# Patient Record
Sex: FEMALE | Race: WHITE | NOT HISPANIC OR LATINO | Employment: UNEMPLOYED | URBAN - NONMETROPOLITAN AREA
[De-identification: names, ages, dates, MRNs, and addresses within clinical notes are randomized per-mention and may not be internally consistent; named-entity substitution may affect disease eponyms.]

---
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
## 2023-01-04 RX ORDER — MEDROXYPROGESTERONE ACETATE 150 MG/ML
150 INJECTION, SUSPENSION INTRAMUSCULAR
Qty: 1 ML | Refills: 0 | Status: SHIPPED | OUTPATIENT
Start: 2023-01-04

## 2023-01-22 ENCOUNTER — HOSPITAL ENCOUNTER (EMERGENCY)
Facility: HOSPITAL | Age: 18
Discharge: HOME | End: 2023-01-22
Attending: EMERGENCY MEDICINE | Admitting: FAMILY MEDICINE
Payer: COMMERCIAL

## 2023-01-22 VITALS
HEART RATE: 87 BPM | HEIGHT: 63 IN | SYSTOLIC BLOOD PRESSURE: 99 MMHG | DIASTOLIC BLOOD PRESSURE: 68 MMHG | TEMPERATURE: 98.3 F | OXYGEN SATURATION: 97 % | BODY MASS INDEX: 27.64 KG/M2 | RESPIRATION RATE: 18 BRPM | WEIGHT: 156 LBS

## 2023-01-22 DIAGNOSIS — K04.7 DENTAL ABSCESS: Primary | ICD-10-CM

## 2023-01-22 PROCEDURE — 63710000001 ONDANSETRON ODT 4 MG TABLET DISPERSIBLE

## 2023-01-22 PROCEDURE — 99283 EMERGENCY DEPT VISIT LOW MDM: CPT

## 2023-01-22 RX ORDER — HYDROCODONE BITARTRATE AND ACETAMINOPHEN 5; 325 MG/1; MG/1
1 TABLET ORAL ONCE
Status: COMPLETED | OUTPATIENT
Start: 2023-01-22 | End: 2023-01-22

## 2023-01-22 RX ORDER — LIDOCAINE HYDROCHLORIDE 20 MG/ML
10 SOLUTION OROPHARYNGEAL ONCE
Status: COMPLETED | OUTPATIENT
Start: 2023-01-22 | End: 2023-01-22

## 2023-01-22 RX ORDER — ONDANSETRON 4 MG/1
4 TABLET, ORALLY DISINTEGRATING ORAL ONCE
Status: COMPLETED | OUTPATIENT
Start: 2023-01-22 | End: 2023-01-22

## 2023-01-22 RX ORDER — ALUMINA, MAGNESIA, AND SIMETHICONE 2400; 2400; 240 MG/30ML; MG/30ML; MG/30ML
10 SUSPENSION ORAL ONCE
Status: COMPLETED | OUTPATIENT
Start: 2023-01-22 | End: 2023-01-22

## 2023-01-22 RX ORDER — PENICILLIN V POTASSIUM 500 MG/1
500 TABLET ORAL ONCE
Status: COMPLETED | OUTPATIENT
Start: 2023-01-22 | End: 2023-01-22

## 2023-01-22 RX ORDER — PENICILLIN V POTASSIUM 500 MG/1
500 TABLET ORAL 4 TIMES DAILY
Qty: 28 TABLET | Refills: 0 | Status: SHIPPED | OUTPATIENT
Start: 2023-01-22 | End: 2023-01-29

## 2023-01-22 RX ADMIN — ALUMINUM HYDROXIDE, MAGNESIUM HYDROXIDE, SIMETHICONE 10 ML: 200; 200; 20 LIQUID ORAL at 17:39

## 2023-01-22 RX ADMIN — ONDANSETRON 4 MG: 4 TABLET, ORALLY DISINTEGRATING ORAL at 17:32

## 2023-01-22 RX ADMIN — BENZOCAINE, BUTAMBEN, AND TETRACAINE HYDROCHLORIDE: .028; .004; .004 AEROSOL, SPRAY TOPICAL at 17:33

## 2023-01-22 RX ADMIN — HYDROCODONE BITARTRATE AND ACETAMINOPHEN 1 TABLET: 5; 325 TABLET ORAL at 17:32

## 2023-01-22 RX ADMIN — PENICILLIN V POTASSIUM 500 MG: 500 TABLET, FILM COATED ORAL at 17:39

## 2023-01-22 RX ADMIN — LIDOCAINE HYDROCHLORIDE 10 ML: 20 SOLUTION ORAL; TOPICAL at 17:32

## 2023-01-22 NOTE — DISCHARGE INSTRUCTIONS
Home to rest. Take antibiotics as prescribed. May use dental balls to affected tooth for temporary relief of pain. Tylenol and motrin for pain. May use dental wax to protect open area temporarily. Call dentist for appointment. May use number provided of try [facility]. Return for worsening symptoms.

## 2023-01-22 NOTE — Clinical Note
[facility] EMERGENCY DEPARTMENT  [address] [place] [id number]  Phone: [phone]    Bina Pedroza was seen and treated in our emergency department on 1/22/2023.  She may return to work on 01/24/2023.         Thank you for choosing [facility].    Sherly Elizondo, APRN       no weight-bearing restrictions

## 2023-01-22 NOTE — ED PROVIDER NOTES
Subjective   History of Present Illness  17 year old female patient presents to ER for complaint of dental pain and swelling to left lower jaw for past two days. She has nausea, but denies fever. She reports that she had part of a root canal which was never completed and she does not currently have a dental home. She has been taking ibuprofen and an unknown antibiotic she had leftover from a previous concern without relief. She uses an electronic cigarette, denies ETOH or illicit drug use.        Review of Systems   Constitutional: Negative.  Negative for fever.   HENT: Positive for dental problem and facial swelling. Negative for trouble swallowing.    Eyes: Negative.    Respiratory: Negative.    Cardiovascular: Negative.    Gastrointestinal: Positive for nausea. Negative for vomiting.   Endocrine: Negative.    Genitourinary: Negative.    Musculoskeletal: Negative.    Skin: Negative.    Allergic/Immunologic: Negative.    Neurological: Negative.    Hematological: Negative.    Psychiatric/Behavioral: Negative.        Past Medical History:   Diagnosis Date   • Anemia during pregnancy in third trimester 4/27/2020   • Anxiety    • Depressed    • Encounter for administrative examinations     unspecified   • Hand pain     Right hand 4th digit closed fracture   • High-risk pregnancy, young multigravida, unspecified trimester 11/20/2019   • History of precipitous delivery    • Mood disorder (HCC)    • Pain in throat    • Smoker    • Upper respiratory infection    • Verruca vulgaris        No Known Allergies    Past Surgical History:   Procedure Laterality Date   • SPLINT APPLICATION      finger splint dynamic  (1)       Family History   Problem Relation Age of Onset   • No Known Problems Father    • No Known Problems Sister    • No Known Problems Daughter    • Prostate cancer Paternal Grandfather    • Liver cancer Paternal Grandmother    • COPD Maternal Grandmother    • Heart disease Maternal Grandmother    • No Known  "Problems Sister    • No Known Problems Son    • No Known Problems Brother        Social History     Socioeconomic History   • Marital status: Single   Tobacco Use   • Smoking status: Former     Packs/day: 0.50     Years: 2.00     Pack years: 1.00     Types: Cigarettes   • Smokeless tobacco: Never   Vaping Use   • Vaping Use: Every day   Substance and Sexual Activity   • Alcohol use: Not Currently   • Drug use: No   • Sexual activity: Yes     Partners: Male           Objective    BP 99/68 (BP Location: Right arm, Patient Position: Sitting)   Pulse 87   Temp 98.3 °F (36.8 °C) (Oral)   Resp 18   Ht 160 cm (63\")   Wt 70.8 kg (156 lb)   LMP  (LMP Unknown)   SpO2 97%   Breastfeeding No   BMI 27.63 kg/m²     Physical Exam  Vitals and nursing note reviewed.   Constitutional:       General: She is not in acute distress.     Appearance: Normal appearance. She is not ill-appearing, toxic-appearing or diaphoretic.   HENT:      Head: Normocephalic.      Right Ear: External ear normal.      Left Ear: External ear normal.      Nose: Nose normal.      Mouth/Throat:      Mouth: Mucous membranes are moist.      Dentition: Dental tenderness, dental caries and dental abscesses present.      Pharynx: Uvula midline. No pharyngeal swelling, oropharyngeal exudate, posterior oropharyngeal erythema or uvula swelling.        Comments: Dental darlene noted between left mandibular teeth as marked above without bleeding. Crowns of teeth intact, no pulp visible.   Eyes:      Pupils: Pupils are equal, round, and reactive to light.   Cardiovascular:      Rate and Rhythm: Normal rate and regular rhythm.      Pulses: Normal pulses.      Heart sounds: Normal heart sounds.   Pulmonary:      Effort: Pulmonary effort is normal.      Breath sounds: Normal breath sounds.   Abdominal:      General: Bowel sounds are normal.      Palpations: Abdomen is soft.   Musculoskeletal:         General: Normal range of motion.      Cervical back: Normal range of " motion and neck supple. Tenderness present.   Lymphadenopathy:      Cervical: No cervical adenopathy.   Skin:     General: Skin is warm and dry.      Capillary Refill: Capillary refill takes less than 2 seconds.   Neurological:      Mental Status: She is alert and oriented to person, place, and time.   Psychiatric:         Mood and Affect: Mood normal.         Behavior: Behavior normal.         Thought Content: Thought content normal.         Judgment: Judgment normal.         Procedures           ED Course                                           MDM    Final diagnoses:   Dental abscess       ED Disposition  ED Disposition     ED Disposition   Discharge    Condition   Stable    Comment   --             Ross Jacques, CALEB  [address] [place] [id number] [phone]      ER follow up, As needed         Medication List      New Prescriptions    penicillin v potassium 500 MG tablet  Commonly known as: VEETID  Take 1 tablet by mouth 4 (Four) Times a Day for 7 days.           Where to Get Your Medications      These medications were sent to [facility] - [place] - [address] - [phone]  - [phone]  [address], [facility] [address]    Phone: [phone]   · penicillin v potassium 500 MG tablet          Sherly Elizondo, APRN  01/22/23 1301

## 2023-07-14 PROBLEM — Z87.59 HISTORY OF PRIOR PREGNANCY WITH IUGR NEWBORN: Status: ACTIVE | Noted: 2023-07-14

## 2023-07-14 PROBLEM — O46.8X1 SUBCHORIONIC HEMORRHAGE IN FIRST TRIMESTER: Status: ACTIVE | Noted: 2023-07-14

## 2023-07-14 PROBLEM — Z82.79 FAMILY HISTORY OF DOWN SYNDROME: Status: RESOLVED | Noted: 2021-11-15 | Resolved: 2023-07-14

## 2023-07-14 PROBLEM — O41.8X10 SUBCHORIONIC HEMORRHAGE IN FIRST TRIMESTER: Status: RESOLVED | Noted: 2021-11-15 | Resolved: 2023-07-14

## 2023-07-14 PROBLEM — F32.A DEPRESSION AFFECTING PREGNANCY: Status: RESOLVED | Noted: 2020-04-27 | Resolved: 2023-07-14

## 2023-07-14 PROBLEM — O99.013 ANEMIA IN PREGNANCY, THIRD TRIMESTER: Status: RESOLVED | Noted: 2022-05-25 | Resolved: 2023-07-14

## 2023-07-14 PROBLEM — A74.9 CHLAMYDIA INFECTION DURING PREGNANCY: Status: RESOLVED | Noted: 2021-11-15 | Resolved: 2023-07-14

## 2023-07-14 PROBLEM — O09.30 HISTORY OF INADEQUATE PRENATAL CARE: Status: RESOLVED | Noted: 2022-03-17 | Resolved: 2023-07-14

## 2023-07-14 PROBLEM — O41.8X10 SUBCHORIONIC HEMORRHAGE IN FIRST TRIMESTER: Status: ACTIVE | Noted: 2023-07-14

## 2023-07-14 PROBLEM — O99.340 DEPRESSION AFFECTING PREGNANCY: Status: RESOLVED | Noted: 2020-04-27 | Resolved: 2023-07-14

## 2023-07-14 PROBLEM — O99.019 ANEMIA AFFECTING PREGNANCY, ANTEPARTUM: Status: RESOLVED | Noted: 2022-05-05 | Resolved: 2023-07-14

## 2023-07-14 PROBLEM — O98.819 CHLAMYDIA INFECTION DURING PREGNANCY: Status: RESOLVED | Noted: 2021-11-15 | Resolved: 2023-07-14

## 2023-07-14 PROBLEM — Z87.59 HISTORY OF PRIOR PREGNANCY WITH IUGR NEWBORN: Status: RESOLVED | Noted: 2021-11-15 | Resolved: 2023-07-14

## 2023-07-14 PROBLEM — O46.8X1 SUBCHORIONIC HEMORRHAGE IN FIRST TRIMESTER: Status: RESOLVED | Noted: 2021-11-15 | Resolved: 2023-07-14

## 2023-07-14 PROBLEM — Z91.89 AT RISK FOR GESTATIONAL DIABETES MELLITUS: Status: RESOLVED | Noted: 2022-05-02 | Resolved: 2023-07-14

## 2023-07-14 PROBLEM — Z37.9 NORMAL LABOR: Status: RESOLVED | Noted: 2022-06-19 | Resolved: 2023-07-14

## 2023-07-14 PROBLEM — Z91.199 NONCOMPLIANCE: Status: RESOLVED | Noted: 2020-05-18 | Resolved: 2023-07-14

## 2023-08-10 ENCOUNTER — ROUTINE PRENATAL (OUTPATIENT)
Dept: OBSTETRICS AND GYNECOLOGY | Facility: CLINIC | Age: 18
End: 2023-08-10
Payer: COMMERCIAL

## 2023-08-10 ENCOUNTER — LAB (OUTPATIENT)
Dept: LAB | Facility: HOSPITAL | Age: 18
End: 2023-08-10
Payer: COMMERCIAL

## 2023-08-10 VITALS — WEIGHT: 164 LBS | DIASTOLIC BLOOD PRESSURE: 70 MMHG | BODY MASS INDEX: 29.05 KG/M2 | SYSTOLIC BLOOD PRESSURE: 102 MMHG

## 2023-08-10 DIAGNOSIS — O41.8X10 SUBCHORIONIC HEMORRHAGE OF PLACENTA IN FIRST TRIMESTER, SINGLE OR UNSPECIFIED FETUS: ICD-10-CM

## 2023-08-10 DIAGNOSIS — Z36.89 ENCOUNTER FOR FETAL ANATOMIC SURVEY: ICD-10-CM

## 2023-08-10 DIAGNOSIS — O09.91 SUPERVISION OF HIGH RISK PREGNANCY IN FIRST TRIMESTER: Primary | ICD-10-CM

## 2023-08-10 DIAGNOSIS — O21.9 NAUSEA AND VOMITING DURING PREGNANCY: ICD-10-CM

## 2023-08-10 DIAGNOSIS — O46.8X1 SUBCHORIONIC HEMORRHAGE OF PLACENTA IN FIRST TRIMESTER, SINGLE OR UNSPECIFIED FETUS: ICD-10-CM

## 2023-08-10 DIAGNOSIS — Z36.0 ENCOUNTER FOR ANTENATAL SCREENING FOR CHROMOSOMAL ANOMALIES: ICD-10-CM

## 2023-08-10 DIAGNOSIS — Z3A.11 11 WEEKS GESTATION OF PREGNANCY: ICD-10-CM

## 2023-08-10 DIAGNOSIS — Z87.59 HISTORY OF PRECIPITOUS DELIVERY: ICD-10-CM

## 2023-08-10 RX ORDER — ONDANSETRON 4 MG/1
4 TABLET, FILM COATED ORAL EVERY 8 HOURS PRN
Qty: 30 TABLET | Refills: 2 | Status: SHIPPED | OUTPATIENT
Start: 2023-08-10 | End: 2024-08-09

## 2023-08-10 NOTE — PROGRESS NOTES
CC: Prenatal visit    Bina Pedroza is a 18 y.o.  at 11w6d.  Doing well.  Denies contractions, LOF, or VB.  Having some nausea and a couple episodes of vomiting.    /70   Wt 74.4 kg (164 lb)   LMP 2023 (Exact Date)   BMI 29.05 kg/mý      Fetal Heart Rate: 130s     Problems (from 23 to present)       Problem Noted Resolved    History of precipitous delivery 8/10/2023 by Tereza Ludwig MD No    Subchorionic hemorrhage in first trimester 2023 by Lori Danielson APRN No    Overview Signed 2023  1:29 PM by Lori Danielson APRN     JANI 3.09cm.          Supervision of high risk pregnancy in first trimester 11/15/2021 by Bello Wilkes MD No    Overview Signed 11/15/2021  4:30 PM by Bello Wilkes MD     By ultrasound 2021 8-3/7-week BETZY 2022               A/P: Bina Pedroza is a 18 y.o.  at 11w6d.  - RTC in 4 weeks  - RTC in 8 weeks w/ anatomy US  - NIPT ordered  - Zofran     Diagnosis Plan   1. Supervision of high risk pregnancy in first trimester        2. Subchorionic hemorrhage of placenta in first trimester, single or unspecified fetus        3. Encounter for  screening for chromosomal anomalies  Ita Robinma Prenatal Test: Chromosomes 13, 18, 21, X & Y: Triploidy 22Q.11.2 Deletion - Blood,      4. Nausea and vomiting during pregnancy  ondansetron (Zofran) 4 MG tablet      5. Encounter for fetal anatomic survey  US Ob 14 + Weeks Single or First Gestation      6. History of precipitous delivery        7. 11 weeks gestation of pregnancy          Tereza Ludwig MD  8/10/2023  14:59 CDT

## 2023-08-29 ENCOUNTER — PATIENT OUTREACH (OUTPATIENT)
Dept: LABOR AND DELIVERY | Facility: HOSPITAL | Age: 18
End: 2023-08-29
Payer: COMMERCIAL

## 2023-08-29 NOTE — OUTREACH NOTE
[facility]  Enrollment    Current Estimated Gestational Age: 14w4d    Questions/Answers      Flowsheet Row Responses   Would like to participate? No            Contact info provided, encouraged to call if she has any questions, concerns, or needs assistance with resources.  DL Cooley RN  Maternity Nurse Navigator    8/29/2023, 12:46 CDT

## 2025-01-26 NOTE — PROGRESS NOTES
10/13/19 1100   Group 1   Start Time 0930   Stop Time 1015   Length (min) 45 Min   Group Name Check-In   Focus of Group Symptoms and Goals   Attendance Not present     SUSANA Arango     "CC: Prenatal visit    Bina Pedroza is a 15 y.o.  at 31w1d.  Doing well.  No complaints.  Denies contractions, LOF, or VB.  Reports good FM.    BP (!) 102/59   Wt 64.4 kg (142 lb)   LMP  (LMP Unknown)   Fetal NST with baseline of approximately 120 bpm positive accelerations, no decelerations moderate variability, no contractions  BPP done with 8 out of 8 findings.  SAVAGE was 9.5 cm.  Fundal Height (cm): 30 cm  Fetal Heart Rate: 135     Problems (from 19 to present)     Problem Noted Resolved    Noncompliance by refusing service 2020 by Bello Wilkes MD No    IUGR (intrauterine growth restriction) affecting care of mother, third trimester, fetus 1 3/19/2020 by Bello Wilkes MD No    Overview Signed 3/19/2020  4:24 PM by Bello Wilkes MD     Severe.  Discussed Dr. Rubi  group.  X2 a week biophysical profile and NST         Young multigravida in second trimester 2019 by Lori Danielson APRN No    Overview Addendum 2020  9:06 AM by Lori Danielson APRN     O pos / Neg antibody / Rubella Imm / GBS unk  Dating: BETZY 2020 by [facility] (unknown LMP)  Genetics: Declined  Flu vaccine: Declined  Anatomy: It's a BOY \"Anibal Mac\"- subopts remain.   Tdap: 28 wk  H&H/Plts:  36.7 / 378 on 19  Plans to formula feed         Tobacco use in pregnancy, antepartum 2019 by Lori Danielson APRN No    Overview Signed 2019  3:55 PM by Lori Danielson APRN     1/2 pack day smoker.  Counseled on cessation/cutting back.          Influenza vaccine refused 2019 by Lori Danielson APRN No    Anxiety and depression 2017 by Coleen Franco MD No    High-risk pregnancy, young multigravida, unspecified trimester 2019 by Lori Danielson APRN 2019 by Lori Danielson APRN    Nausea and vomiting during pregnancy prior to 22 weeks gestation 2019 by Lori Danielson APRN 2020 by Lori Danielson APRN    Overview " Signed 2019  3:56 PM by Lori Danielson APRN     Vitamin B6                A/P: Bina Pedroza is a 15 y.o.  at 31w1d.  Patient with above problems currently stable doing well with reactive NST and 8 out of 8 BPP today.  Patient to follow-up on Monday next week for repeat BPP and NST fetal kick count and  labor precautions given patient to return sooner as needed.  - RTC in 1 weeks  - Reviewed COVID-19 visitation policy  - Reviewed COVID-19 precautions     Diagnosis Plan   1. 31 weeks gestation of pregnancy     2. Noncompliance by refusing service     3. IUGR (intrauterine growth restriction) affecting care of mother, third trimester, fetus 1     4. Young multigravida in second trimester     5. Tobacco use in pregnancy, antepartum     6. Influenza vaccine refused     7. Anxiety and depression       José Luis Cabrera,   4/10/2020  11:30     Admitted